# Patient Record
Sex: MALE | Race: WHITE
[De-identification: names, ages, dates, MRNs, and addresses within clinical notes are randomized per-mention and may not be internally consistent; named-entity substitution may affect disease eponyms.]

---

## 2020-11-20 ENCOUNTER — HOSPITAL ENCOUNTER (EMERGENCY)
Dept: HOSPITAL 61 - ER | Age: 30
Discharge: LEFT BEFORE DISCHARGE | End: 2020-11-20
Payer: SELF-PAY

## 2020-11-20 VITALS — BODY MASS INDEX: 29.86 KG/M2 | HEIGHT: 72 IN | WEIGHT: 220.46 LBS

## 2020-11-20 VITALS — SYSTOLIC BLOOD PRESSURE: 153 MMHG | DIASTOLIC BLOOD PRESSURE: 67 MMHG

## 2020-11-20 DIAGNOSIS — R07.89: ICD-10-CM

## 2020-11-20 DIAGNOSIS — M25.511: ICD-10-CM

## 2020-11-20 DIAGNOSIS — G89.11: ICD-10-CM
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
## 2020-11-20 DIAGNOSIS — M25.512: Primary | ICD-10-CM
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
## 2020-11-20 PROCEDURE — 99283 EMERGENCY DEPT VISIT LOW MDM: CPT
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
